# Patient Record
Sex: MALE | Race: WHITE | NOT HISPANIC OR LATINO | Employment: UNEMPLOYED | URBAN - METROPOLITAN AREA
[De-identification: names, ages, dates, MRNs, and addresses within clinical notes are randomized per-mention and may not be internally consistent; named-entity substitution may affect disease eponyms.]

---

## 2017-10-30 ENCOUNTER — GENERIC CONVERSION - ENCOUNTER (OUTPATIENT)
Dept: OTHER | Facility: OTHER | Age: 57
End: 2017-10-30

## 2018-01-11 ENCOUNTER — ALLSCRIPTS OFFICE VISIT (OUTPATIENT)
Dept: OTHER | Facility: OTHER | Age: 58
End: 2018-01-11

## 2018-01-11 NOTE — PSYCH
Behavioral Health Outpatient Intake    Referred By: INSURANCE  Intake Questions: there are no developmental disabilities  the patient does not have a hearing impairment  the patient has an ICM/CTT  INDEPENDENT LIVING  patient is not taking injectable psychiatric medications  Employment: The patient is not employed  at disabled  Emergency Contact Information:   Emergency Contact: Robbie Galvan   Relationship to Patient: WIFE   Phone Number: [phone]   Previous Psychiatric Treatment: He has previously been seen by a psychiatrist  Ana Lilia Franco 9/2017  He has previously been seen by a therapist  Ana Lilia Franco, 9/2017   History: no  service  He has not had combat service  He was not activated into federal active duty as a member of the [employer] or [employer]  Insurance Subscriber: [employer]   Primary Insurance: [employer]   ID number: [id number]         Presenting Problem (in patient's words): DEPRESSION AND ANXIETY  Substance Abuse: NONE     Previous Treatment: The patient has not been seen here in the past         Primary Care Physician: DR Brian Graham 1/11/18 @ 1:00 [id number]       Signatures   Electronically signed by : Bossman Mckenzie, ; Oct 30 2017  1:54PM EST                       (Author)
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
## 2018-03-06 ENCOUNTER — TELEPHONE (OUTPATIENT)
Dept: BEHAVIORAL/MENTAL HEALTH CLINIC | Facility: CLINIC | Age: 58
End: 2018-03-06

## 2018-05-02 ENCOUNTER — DOCUMENTATION (OUTPATIENT)
Dept: BEHAVIORAL/MENTAL HEALTH CLINIC | Facility: CLINIC | Age: 58
End: 2018-05-02

## 2018-05-02 ENCOUNTER — OFFICE VISIT (OUTPATIENT)
Dept: BEHAVIORAL/MENTAL HEALTH CLINIC | Facility: CLINIC | Age: 58
End: 2018-05-02
Payer: COMMERCIAL

## 2018-05-02 DIAGNOSIS — F32.1 CURRENT MODERATE EPISODE OF MAJOR DEPRESSIVE DISORDER WITHOUT PRIOR EPISODE (HCC): ICD-10-CM

## 2018-05-02 DIAGNOSIS — F41.1 GENERALIZED ANXIETY DISORDER: Primary | ICD-10-CM

## 2018-05-02 PROCEDURE — 90834 PSYTX W PT 45 MINUTES: CPT | Performed by: SOCIAL WORKER

## 2018-05-02 NOTE — PSYCH
Psychotherapy Provided: Individual Psychotherapy 45 minutes PHQ 9 = 13; JOVITA 7 =10; states his wife and his son (currently in college) are encouraging him regarding his reported medical issues and the anxiety he reports he experiences; "I get depressed about the insurance situation for my wife (alleges she was discontinued by her insurance carrier  I can barely can make ends meet " He reports his wife re-applied for the health insurance and is waiting for the decision for the past four months " states the anxiety extends 8 - 9 years ago contending at that time had experienced chest pains and was diagnosed with diabetes, HTN and elevated cholesterol; He was subsequently diagnosed with COPD  states he quit smoking 6 -7 years ago; He notes his wife is a hypochondriac which he states is stressor to him  He identified another stressor which is his report of his wife and his son argue with each other  Discussed anxiety management (handout)to include focusing on what one knows (not what one does not know) and what one can control (realistic expectations) and building one's knowledge base from that point;  A: presents as worried about his health and his finances; He identifies his additional stressors as his wife's health issues (perceived and otherwise) and the reported conflicts between his wife and their son; He expressed concern today about "my oxygen running out  (during this session)  " He was using oxygen tank when entering the counseling session  P:(G# 1) will complete his treatment plan to include strategies for effective anxiety management;      Length of time in session: 45 minutes, follow up in 1 month    Goals addressed in session: Goal 1     Pain:      none    0    Current suicide risk : [address]: Diagnosis and Treatment Plan explained to Clinton Ash relates understanding diagnosis and is agreeable to Treatment Plan   Yes

## 2018-05-16 ENCOUNTER — TELEPHONE (OUTPATIENT)
Dept: BEHAVIORAL/MENTAL HEALTH CLINIC | Facility: CLINIC | Age: 58
End: 2018-05-16

## 2018-05-16 ENCOUNTER — DOCUMENTATION (OUTPATIENT)
Dept: BEHAVIORAL/MENTAL HEALTH CLINIC | Facility: CLINIC | Age: 58
End: 2018-05-16

## 2018-05-30 ENCOUNTER — OFFICE VISIT (OUTPATIENT)
Dept: BEHAVIORAL/MENTAL HEALTH CLINIC | Facility: CLINIC | Age: 58
End: 2018-05-30
Payer: COMMERCIAL

## 2018-05-30 ENCOUNTER — DOCUMENTATION (OUTPATIENT)
Dept: BEHAVIORAL/MENTAL HEALTH CLINIC | Facility: CLINIC | Age: 58
End: 2018-05-30

## 2018-05-30 DIAGNOSIS — F41.1 GENERALIZED ANXIETY DISORDER: Primary | ICD-10-CM

## 2018-05-30 DIAGNOSIS — F32.1 CURRENT MODERATE EPISODE OF MAJOR DEPRESSIVE DISORDER WITHOUT PRIOR EPISODE (HCC): ICD-10-CM

## 2018-05-30 PROCEDURE — 90834 PSYTX W PT 45 MINUTES: CPT | Performed by: SOCIAL WORKER

## 2018-05-30 NOTE — PROGRESS NOTES
Mendez Finesserosa maria  1960       Date of Initial Treatment Plan: /30/18  Date of Current Treatment Plan: 05/30/18    Treatment Plan Number 1     Strengths/Personal Resources for Self Care: "friendly, helpful, for the most part happy   it comes and goes;"    Diagnosis:   F41 1, F32 1    Area of Needs: depression    Long Term Goal 1: I want to deal with the depression (be less depressed)  Target Date: 9/30/18  Completion Date: n/a         Short Term Objectives for Goal 1: AI will identify certain things that aggravete (triggers) the depression  (ex  When the wife and kid argue; "not having enough money to pay the bills")  B  I will be aware of when I am using depressed thinking  C  I will learn to think more positively/realistically ( "I have options ")  D  I will keep my med ck appointments  E  I will make sure I am taking care of myself      Target Date: 9/30/18  Completion Date: n/a      GOAL 1: Modality: Individual 1x per month   Completion Date n/a        Behavioral Health Treatment Plan [facility]: Diagnosis and Treatment Plan explained to Idania Putnam relates understanding diagnosis and is agreeable to Treatment Plan  yes      Client Comments : Please share your thoughts, feelings, need and/or experiences regarding your treatment plan:       __________________________________________________________________    __________________________________________________________________    __________________________________________________________________    __________________________________________________________________    _______________________________________                Patient signature, Date Time: __________________________________________             Physician cosigner signature, Date, Time: ________________________________
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
## 2018-05-30 NOTE — PROGRESS NOTES
Treatment Plan Tracking    # 1Treatment Plan not completed within required time limits due to: Client could not be scheduled before today to complete a tx plan (please refer to his most recent previous appt and reason for not completing tx plan)  Michelle Martinez

## 2018-06-25 ENCOUNTER — OFFICE VISIT (OUTPATIENT)
Dept: BEHAVIORAL/MENTAL HEALTH CLINIC | Facility: CLINIC | Age: 58
End: 2018-06-25
Payer: COMMERCIAL

## 2018-06-25 DIAGNOSIS — F41.1 GENERALIZED ANXIETY DISORDER: ICD-10-CM

## 2018-06-25 DIAGNOSIS — F32.1 MAJOR DEPRESSIVE DISORDER, SINGLE EPISODE, MODERATE (HCC): Primary | ICD-10-CM

## 2018-06-25 PROCEDURE — 90834 PSYTX W PT 45 MINUTES: CPT | Performed by: SOCIAL WORKER

## 2018-06-25 NOTE — PSYCH
Psychotherapy Provided: Individual Psychotherapy 45 minutes PHQ9 =10; "We got the ramp ("My [employer]az[employer] donated it to me") put in at the house  The [employer] guys put it up " states expects the motorized scooter will arrive within the next week;"It will give me a chance to get out more " "At least something good is coming out of it " reports the [employer] representative will readjust his waiver for him to be able to obtain a walk-in shower; states may have a three month waiting period; states his depression is lessened "a little" due to his report of his adjusted perspctive about his financial situation: "getting upset about it (his reported financial situation) isn't going to change anything;" "Things at home are the same with the wife " states spent his most recent SSD check within a few days with reported expenses has $20 00 left;  had used his parents' allegedly "small inheritance" to help with the expenses;  does sit on his patio when the weather is more pleasant; discussed Stages of Change regarding factors in his life regarding some of his reported challenges; discussed/reviewed effective communication strategies regarding his reported concerns with his family; A: presents as pleasant in his demeanor and as wanting to do for himself; P: (G#1 ) will continue to use effective communication strategies (including boundaries) regarding hsi relationships in his family; Length of time in session: 45 minutes, follow up in 2 week    Goals addressed in session: Goal 1     Pain:      none    0    Current suicide risk : Low         Behavioral Health Treatment Plan [facility]: Diagnosis and Treatment Plan explained to Idania Putnam relates understanding diagnosis and is agreeable to Treatment Plan   Yes

## 2018-07-13 ENCOUNTER — OFFICE VISIT (OUTPATIENT)
Dept: PSYCHIATRY | Facility: CLINIC | Age: 58
End: 2018-07-13
Payer: COMMERCIAL

## 2018-07-13 VITALS — SYSTOLIC BLOOD PRESSURE: 116 MMHG | DIASTOLIC BLOOD PRESSURE: 69 MMHG | HEIGHT: 68 IN | HEART RATE: 61 BPM

## 2018-07-13 DIAGNOSIS — F41.0 PANIC ATTACKS: ICD-10-CM

## 2018-07-13 DIAGNOSIS — F41.1 GENERALIZED ANXIETY DISORDER: Primary | ICD-10-CM

## 2018-07-13 DIAGNOSIS — F32.1 MAJOR DEPRESSIVE DISORDER, SINGLE EPISODE, MODERATE (HCC): ICD-10-CM

## 2018-07-13 PROBLEM — E66.01 MORBID OBESITY (HCC): Status: ACTIVE | Noted: 2018-07-13

## 2018-07-13 PROBLEM — E78.5 HYPERLIPIDEMIA: Status: ACTIVE | Noted: 2018-07-13

## 2018-07-13 PROBLEM — I10 HYPERTENSION: Status: ACTIVE | Noted: 2018-07-13

## 2018-07-13 PROBLEM — J44.9 COPD (CHRONIC OBSTRUCTIVE PULMONARY DISEASE) (HCC): Status: ACTIVE | Noted: 2018-07-13

## 2018-07-13 PROCEDURE — 99215 OFFICE O/P EST HI 40 MIN: CPT | Performed by: PHYSICIAN ASSISTANT

## 2018-07-13 RX ORDER — ZOLPIDEM TARTRATE 10 MG/1
10 TABLET ORAL
Qty: 30 TABLET | Refills: 1 | Status: SHIPPED | OUTPATIENT
Start: 2018-07-13 | End: 2018-09-07 | Stop reason: ALTCHOICE

## 2018-07-13 RX ORDER — DULOXETIN HYDROCHLORIDE 60 MG/1
60 CAPSULE, DELAYED RELEASE ORAL DAILY
Qty: 90 CAPSULE | Refills: 0 | Status: SHIPPED | OUTPATIENT
Start: 2018-07-13 | End: 2018-09-07 | Stop reason: SDUPTHER

## 2018-07-13 RX ORDER — ALPRAZOLAM 0.5 MG/1
0.5 TABLET ORAL DAILY PRN
Qty: 30 TABLET | Refills: 0 | Status: SHIPPED | OUTPATIENT
Start: 2018-07-13 | End: 2018-09-07 | Stop reason: SDUPTHER

## 2018-07-13 RX ORDER — HYDROXYZINE PAMOATE 50 MG/1
50 CAPSULE ORAL 3 TIMES DAILY
Qty: 270 CAPSULE | Refills: 0 | Status: SHIPPED | OUTPATIENT
Start: 2018-07-13 | End: 2018-09-07 | Stop reason: SDUPTHER

## 2018-07-13 NOTE — PSYCH
[address] Sid[address] Whitten [facility]    Name and Date of Birth:  Joy Helton 65 y o  1960    Date of Visit: July 13, 2018    Reason for visit: No chief complaint on file  HPI     Barbette Schlatter is a 65 y o  male with a history of depression, anxiety and panic attacks and arrives for psychiatric evaluation and medication mgt at the referral of his psychotherapist Maria A Álvarez who has been following Pt since 1/11/2018  Pt had seen a psychiatrist in the past but transitioned to Dina Alonzo by his own choice because he did not feel he was being helped  PMD was prescribing his psychiatric medications most recently (Duloxetine 60mg qd, Hydroxyzine 50mg (1) cap po tid, and Zolpidem 10mg qhs prn insomina,) and PMD added Alprazolam 0 5mg (1) tab po qd prn anxiety  NOTE: Pt states PMD first tried Alprazolam 1mg but requested a lower dose due to sedation  He states he does not always need Alprazolam and he reports compliance to his medications without current SE  He gets anxiety and panic attacks at times as described in below Hx  Last panic attack was triggered by inability to sleep last night due to the heat and inability to get comfortable  His medical issues can trigger his anxiety and panic  He presently denies depression, SI, HI, manic type Sxs (other than racy thoughts associated with anxiety), or psychotic Sxs  He feels his medications are helping adequately overall and wants to keep them the same  He missed his recent therapy appt due to the extreme heat and humidity, but plans to continue individual counseling with Mrs Guero Tellez  I reviewed her 1/11/2018 through 6/25/2018 notes  He ambulates with a walker and wears nasal canula for continuous O2 for his COPD  Pt grew up with biological mother, father and 3 brothers  Pt states he got along with parents  The siblings did not get along well in childhood, but better as adults      Pt first experienced Sxs of a psychiatric nature (depression and anxiety, at times with panic attacks) when he could no longer work due to his medical problems  Sxs were sadness, reduced appetite, self-isolating, anhedonia, feelings of helplessness, hopelessness, worthlessness, and SI without h/o attempt  He had one serious SI with a plan to drive his car into a tree but did not attempt because he thought of his son  Anxiety and panic attack Sxs as below  no symptoms of solo    Anxiety:   difficulty concentrating, fatigue, insomnia, irritability, restlessness/keyed up and muscle tension and racy thoughts  Panic attacks:  palpitations/racing heart, sweating, trembling, shortness of breath, nausea/GI distress, feeling hot, and sometimes dizziness      He reports 3 incidences of hearing a voice calling his name but has not had it since taking his psychiatric medicines  Hospitalization:  Once in 2017 at [place] [address] --for "Really bad anxiety" and panic attacks--self admit  No SI at that time  He was started Duloxetine, Zolpidem and Hydroxyzine  After discharge he was followed by a psychiatrist and psychotherapist outpatient for approx 1 year (Saw Dr Alejandro Mendez through the [facility] in [facility]  )  He left them because Pt did not feel he was being helped  He then came to [facility] for psych care and started therapy with Su Carrillo on 1/11/2018  PMD continued his psychiatric medications and added Alprazolam for anxiety  Pt was referred to psychiatric evaluation and care for medication mgt    Pt denies any h/o suicide attempts, self-harm behaviors other than putting a rope around his neck as a child because his mother told him to (firmly denies any suicide intent, was following what his mom told him to do), violent behaviors, ECT, or legal or  Hx      Prior Rx trials: Duloxetine, Zolpidem, Hydroxyzine, Alprazolam       HPI ROS Appetite Changes and Sleep: disrupted sleep, normal appetite, normal energy level      Review Of Systems:    Constitutional negative   ENT negative   Cardiovascular as per HPI during panic attacks   Respiratory shortness of breath, dyspnea on exertion, as noted in HPI and and during panic attacks   Gastrointestinal negative and as per HPI during panic attacks   Genitourinary negative   Musculoskeletal negative   Integumentary negative   Neurological negative and as per HPI during panic attacks   Endocrine negative   Other Symptoms none       Past Psychiatric History:     Pt grew up with biological mother, father and 3 brothers  Pt states he got along with parents  The siblings did not get along well in childhood, but better as adults  Pt first experienced Sxs of a psychiatric nature (depression and anxiety, at times with panic attacks) when he could no longer work due to his medical problems  Sxs were sadness, reduced appetite, self-isolating, anhedonia, feelings of helplessness, hopelessness, worthlessness, and SI without h/o attempt  He had one serious SI with a plan to drive his car into a tree but did not attempt because he thought of his son  Anxiety and panic attack Sxs as below  no symptoms of solo    Anxiety:   difficulty concentrating, fatigue, insomnia, irritability, restlessness/keyed up and muscle tension and racy thoughts  Panic attacks:  palpitations/racing heart, sweating, trembling, shortness of breath, nausea/GI distress, feeling hot, and sometimes dizziness      He reports 3 incidences of hearing a voice calling his name but has not had it since taking his psychiatric medicines  Hospitalization:  Once in 2017 at [place] [address] --for "Really bad anxiety" and panic attacks--self admit  No SI at that time  He was started Duloxetine, Zolpidem and Hydroxyzine        After discharge he was followed by a psychiatrist and psychotherapist outpatient for approx 1 year (Saw Dr Sylvie Pyle through the [address] [place] [address] in [place]  )  He left them because Pt did not feel he was being helped  He then came to [facility] for psych care and started therapy with Tricia Parkinson on 1/11/2018  PMD continued his psychiatric medications and added Alprazolam for anxiety  Pt was referred to psychiatric evaluation and care for medication mgt    Pt denies any h/o suicide attempts, self-harm behaviors other than putting a rope around his neck as a child because his mother told him to (firmly denies any suicide intent, was following what his mom told him to do), violent behaviors, ECT, or legal or  Hx  Prior Rx trials: Duloxetine, Zolpidem, Hydroxyzine, Alprazolam      Family Psychiatric History:     Family History   Problem Relation Age of Onset    Depression Paternal Grandfather     Depression Paternal Uncle     Anxiety disorder Paternal Uncle     Alcohol abuse Paternal Uncle     No Known Problems Brother     No Known Problems Brother     No Known Problems Brother        Substance Use History:    History   Drug Use    Types: Marijuana, Methamphetamines     Comment: Steadily smoked THC from approx 19y/o - 22y/o  --Smoked Meth in the same time period  He quit both drugs on his own  Never any rehab  He denies any IVDA       Social History:    Social History     Social History    Marital status: /Civil Union     Spouse name: N/A    Number of children: 1    Years of education: N/A     Occupational History    Disabled due to medical problems      Social History Main Topics    Smoking status: Former Smoker     Types: Cigarettes    Smokeless tobacco: Never Used      Comment: quit in approx 2012    Alcohol use Yes      Comment: Has a beer a few x a year    Drug use: Yes     Types: Marijuana, Methamphetamines      Comment: Steadily smoked THC from approx 19y/o - 22y/o  --Smoked Meth in the same time period  He quit both drugs on his own  Never any rehab    He denies any IVDA    Sexual activity: Not Currently     Other Topics Concern    Not on file     Social History Narrative    Has 1 child-- a son born 9/11/1998    Home: Lives with wife, son         Education:      Pt has h/o reading and comprehension disability  He was in Special Ed  Classes  He reached childhood milestones on time as far as he knows    Highest grade completed 10th grade    No college              Past Medical History:    History of Seizures: no  History of Head injury with loss of consciousness: no    Past Medical History:   Diagnosis Date    COPD (chronic obstructive pulmonary disease) ([facility] )     Diabetes type 2, controlled ([facility] )     Hyperlipidemia     Hypertension     Loss of bladder control     Lymphedema of both lower extremities     Morbid obesity ([facility] )     Sleep apnea      Past Surgical History:   Procedure Laterality Date    ROTATOR CUFF REPAIR Right     UMBILICAL HERNIA REPAIR       Allergies:    No Known Allergies  History Review: The following portions of the patient's history were reviewed and updated as appropriate: allergies, current medications, past family history, past medical history, past social history, past surgical history and problem list     OBJECTIVE:    Mental Status Evaluation:    Appearance casually dressed, good eye contact and adequate hygiene  Morbidly obese    Wearing nasal canula for O2 and appears to be breathing comfortably   Behavior pleasant, cooperative, calm   Speech normal rate and volume   Mood anxious   Affect normal range and intensity   Thought Processes organized   Associations intact associations   Thought Content No delusions   Perceptual Disturbances: no auditory hallucinations, no visual hallucinations, does not appear responding to internal stimuli   Abnormal Thoughts  Risk Potential Suicidal ideation - None  Homicidal ideation - None  Potential for aggression - No   Orientation oriented to person, place, time/date, situation, day of week, month of year and year   Memory short term memory grossly intact   Cosciousness alert and awake   Attention Span attention span and concentration are age appropriate   Intellect appears to be below average intelligence   Insight fair   Judgement fair   Muscle Strength and  Gait Slow, steady with walker   Language no difficulty naming common objects, no difficulty repeating a phrase   Fund of Knowledge adequate knowledge of current events  adequate fund of knowledge regarding past history  adequate fund of knowledge regarding vocabulary    Pain none   Pain Scale pain       Laboratory Results: I have personally reviewed all pertinent laboratory/tests results  --2018 labwork by [facility] viewed in EPIC    Assessment/Plan:     Diagnoses and all orders for this visit:    Generalized anxiety disorder  -     ALPRAZolam (XANAX) 0 5 mg tablet; Take 1 tablet (0 5 mg total) by mouth daily as needed for anxiety for up to 30 days  -     DULoxetine (CYMBALTA) 60 mg delayed release capsule; Take 1 capsule (60 mg total) by mouth daily for 90 days  -     hydrOXYzine pamoate (VISTARIL) 50 mg capsule; Take 1 capsule (50 mg total) by mouth 3 (three) times a day for 90 days    Panic attacks    Major depressive disorder, single episode, moderate (HCC)  -     DULoxetine (CYMBALTA) 60 mg delayed release capsule; Take 1 capsule (60 mg total) by mouth daily for 90 days  -     zolpidem (AMBIEN) 10 mg tablet; Take 1 tablet (10 mg total) by mouth daily at bedtime as needed for sleep for up to 30 days  -     TSH, 3rd generation; Future  -     RPR; Future  -     CBC and differential; Future          Plan:  Pt is having moderate anxiety and panic attacks with h/o depression  Working Dxs are JOVITA, Panic Attacks, and MDD  He feels Alprazolam and Hydroxyzine are adequate for anxiety and panic Sxs  Mood is stable and I will continue Duloxetine unchanged as well  (In general Pt does not want any changes to his regimen)    Sleep is impaired moreso due to his physical health issues and he should f/u with PMD and specialists for those  Sleep hygiene discussed anyway to optimize sleep and I will continue Zolpidem since he feels it is still helpful  I will take over prescribing all of his psychiatric medications at this time  Pt accepts the plan  Continue:  Duloxetine 60mg (1) cap po qd # 90  Alprazolam 0 5mg (1) tab po qd prn anxiety # 30  (Pt also has pills left from prior Rx by PMD so does not need refills on today's Rx)  Hydroxyzine 50mg (1) cap po tid # 270  Zolpidem 10mg (1) tab po qhs prn insomnia # 30 R2  Get TSH, RPR, CBC with diff  F/U PMD and specialists for medical problems  Continue psychotherapy  Return 8 weeks, call sooner prn    Risks/Benefits/Precautions:      Risks, Benefits And Possible Side Effects Of Medications:    Risks, benefits, and possible side effects of medications explained to Catalina Oconnell and he verbalizes understanding and agreement for treatment  Controlled Medication Discussion:     Catalina Oconnell has been filling controlled prescriptions on time as prescribed according to [facility]    Discussed with Catalina Oconnell the risks of sedation, respiratory depression, impairment of ability to drive and potential for abuse and addiction related to treatment with benzodiazepine medications  He understands risk of treatment with benzodiazepine medications, agrees to not drive if feels impaired and agrees to take medications as prescribed      Margarita Urbina PA-C

## 2018-07-16 ENCOUNTER — TELEPHONE (OUTPATIENT)
Dept: BEHAVIORAL/MENTAL HEALTH CLINIC | Facility: CLINIC | Age: 58
End: 2018-07-16

## 2018-08-27 ENCOUNTER — TELEPHONE (OUTPATIENT)
Dept: BEHAVIORAL/MENTAL HEALTH CLINIC | Facility: CLINIC | Age: 58
End: 2018-08-27

## 2018-09-07 ENCOUNTER — OFFICE VISIT (OUTPATIENT)
Dept: PSYCHIATRY | Facility: CLINIC | Age: 58
End: 2018-09-07
Payer: COMMERCIAL

## 2018-09-07 VITALS — HEART RATE: 73 BPM | SYSTOLIC BLOOD PRESSURE: 105 MMHG | DIASTOLIC BLOOD PRESSURE: 66 MMHG | HEIGHT: 68 IN

## 2018-09-07 DIAGNOSIS — F41.1 GENERALIZED ANXIETY DISORDER: Primary | ICD-10-CM

## 2018-09-07 DIAGNOSIS — F32.1 MAJOR DEPRESSIVE DISORDER, SINGLE EPISODE, MODERATE (HCC): ICD-10-CM

## 2018-09-07 DIAGNOSIS — G47.00 INSOMNIA, UNSPECIFIED TYPE: ICD-10-CM

## 2018-09-07 DIAGNOSIS — F41.0 PANIC ATTACKS: ICD-10-CM

## 2018-09-07 DIAGNOSIS — D50.9 IRON DEFICIENCY ANEMIA, UNSPECIFIED IRON DEFICIENCY ANEMIA TYPE: ICD-10-CM

## 2018-09-07 DIAGNOSIS — J44.9 CHRONIC OBSTRUCTIVE PULMONARY DISEASE, UNSPECIFIED COPD TYPE (HCC): ICD-10-CM

## 2018-09-07 PROBLEM — D64.9 ANEMIA: Status: ACTIVE | Noted: 2018-09-07

## 2018-09-07 PROCEDURE — 99214 OFFICE O/P EST MOD 30 MIN: CPT | Performed by: PHYSICIAN ASSISTANT

## 2018-09-07 RX ORDER — CHOLECALCIFEROL (VITAMIN D3) 125 MCG
5 CAPSULE ORAL
Qty: 90 TABLET | Refills: 0 | Status: SHIPPED | OUTPATIENT
Start: 2018-09-07 | End: 2018-12-06

## 2018-09-07 RX ORDER — ALPRAZOLAM 0.5 MG/1
0.5 TABLET ORAL DAILY PRN
Qty: 30 TABLET | Refills: 1 | Status: SHIPPED | OUTPATIENT
Start: 2018-09-07 | End: 2019-02-26 | Stop reason: SDUPTHER

## 2018-09-07 RX ORDER — HYDROXYZINE PAMOATE 50 MG/1
50 CAPSULE ORAL 3 TIMES DAILY
Qty: 270 CAPSULE | Refills: 0 | Status: SHIPPED | OUTPATIENT
Start: 2018-09-07 | End: 2019-02-26 | Stop reason: SDUPTHER

## 2018-09-07 RX ORDER — DULOXETIN HYDROCHLORIDE 60 MG/1
60 CAPSULE, DELAYED RELEASE ORAL DAILY
Qty: 90 CAPSULE | Refills: 0 | Status: SHIPPED | OUTPATIENT
Start: 2018-09-07 | End: 2019-02-26 | Stop reason: SDUPTHER

## 2018-09-07 NOTE — PSYCH
MEDICATION MANAGEMENT NOTE        [facility]      Name and Date of Birth:  Rico Regalado 65 y o  1960    Date of Visit: September 7, 2018    HPI:    Adam Key is here for medication review with primary c/o "The depression, that's a lot better than what it was  I still get depressed some time depending on the circumstances and rates it 3/10 right now  The anxiety--that happens every once in while and I take the anxiety medicine and that takes care of it "   He rates anxiety as 3-6/10 most of the time, but once it reached 9/10 triggered by an argument with family  Pt is s/p 1 admission August 16, 2018 to [facility]-- for Acute on chronic respiratory failure  While inpatient the physcian stopped the Zolpidem and replaced it with Melatonin 5mg (1) tab po qhs for sleep and Pt feels it is "Doing OK "  He was started on Iron supplementation for anemia and HCTZ for edema and HTN  He was seen in the [facility] ER on 8/31/2018 sent by PMD due to hyperkalemia  K+ was restored and normalized (labwork reviewed)  Pt presently denies SI, HI, panic attacks, manic type Sxs other than some racy thoughts associated with anxiety, or psychotic Sxs  He reports compliance to his psychiatric medications without SE  Pt was seen by [facility] pulmonology 2 days ago and his note was reviewed--pulmonologist discussed options of air pressure treatments for his MICHELINE and recommended pulmonary rehab  Pt intends to pursue these recommendations  Appetite Changes and Sleep: Sleep is improving, normal appetite, Suboptimal energy    Review Of Systems:      Constitutional feeling tired   ENT negative   Cardiovascular negative   Respiratory Chronic SOB, BRADY  Pt wearing nasal canula for continuous )2 and appears to be breathing and speaking without special effort      Gastrointestinal negative   Genitourinary negative   Musculoskeletal negative   Integumentary negative   Neurological negative   Endocrine negative   Other Symptoms none       Past Psychiatric History:   As copied from my 7/13/2018 note:  " [ Pt grew up with biological mother, father and 3 brothers  Pt states he got along with parents  The siblings did not get along well in childhood, but better as adults      Pt first experienced Sxs of a psychiatric nature (depression and anxiety, at times with panic attacks) when he could no longer work due to his medical problems  Sxs were sadness, reduced appetite, self-isolating, anhedonia, feelings of helplessness, hopelessness, worthlessness, and SI without h/o attempt  He had one serious SI with a plan to drive his car into a tree but did not attempt because he thought of his son  Anxiety and panic attack Sxs as below       no symptoms of solo     Anxiety:   difficulty concentrating, fatigue, insomnia, irritability, restlessness/keyed up and muscle tension and racy thoughts      Panic attacks:  palpitations/racing heart, sweating, trembling, shortness of breath, nausea/GI distress, feeling hot, and sometimes dizziness       He reports 3 incidences of hearing a voice calling his name but has not had it since taking his psychiatric medicines      Hospitalization:  Once in 2017 at [place] [address] --for "Really bad anxiety" and panic attacks--self admit  No SI at that time  He was started Duloxetine, Zolpidem and Hydroxyzine        After discharge he was followed by a psychiatrist and psychotherapist outpatient for approx 1 year (Saw Dr Alexandre Guzmán through the [facility] in [facility]  )  He left them because Pt did not feel he was being helped    He then came to [facility] for psych care and started therapy with Tricia Gallo on 1/11/2018        PMD continued his psychiatric medications and added Alprazolam for anxiety      Pt was referred to psychiatric evaluation and care for medication mgt     Pt denies any h/o suicide attempts, self-harm behaviors other than putting a rope around his neck as a child because his mother told him to (firmly denies any suicide intent, was following what his mom told him to do), violent behaviors, ECT, or legal or  Hx      Prior Rx trials: Duloxetine, Zolpidem, Hydroxyzine, Alprazolam                ] "       Past Medical History:    Past Medical History:   Diagnosis Date    COPD (chronic obstructive pulmonary disease) ([facility] )     Diabetes type 2, controlled ([facility] )     Hyperlipidemia     Hypertension     Loss of bladder control     Lymphedema of both lower extremities     Morbid obesity ([facility] )     Sleep apnea        Substance Abuse History:    History   Alcohol Use    Yes     Comment: Has a beer a few x a year     History   Drug Use    Types: Marijuana, Methamphetamines     Comment: Steadily smoked THC from approx 17y/o - 22y/o  --Smoked Meth in the same time period  He quit both drugs on his own  Never any rehab  He denies any IVDA       Social History:    Social History     Social History    Marital status: /Civil Union     Spouse name: N/A    Number of children: 1    Years of education: N/A     Occupational History    Disabled due to medical problems      Social History Main Topics    Smoking status: Former Smoker     Types: Cigarettes    Smokeless tobacco: Never Used      Comment: quit in approx 2012    Alcohol use Yes      Comment: Has a beer a few x a year    Drug use: Yes     Types: Marijuana, Methamphetamines      Comment: Steadily smoked THC from approx 17y/o - 22y/o  --Smoked Meth in the same time period  He quit both drugs on his own  Never any rehab  He denies any IVDA    Sexual activity: Not Currently     Other Topics Concern    Not on file     Social History Narrative    Has 1 child-- a son born 9/11/1998    Home: Lives with wife, son         Education:      Pt has h/o reading and comprehension disability  He was in Special Ed  Classes    He reached childhood milestones on time as far as he knows    Highest grade completed 10th grade    No college                Family Psychiatric History:     Family History   Problem Relation Age of Onset    Depression Paternal Grandfather     Depression Paternal Uncle     Anxiety disorder Paternal Uncle     Alcohol abuse Paternal Uncle     No Known Problems Brother     No Known Problems Brother     No Known Problems Brother        History Review: The following portions of the patient's history were reviewed and updated as appropriate: allergies, current medications, past family history, past medical history, past social history, past surgical history and problem list          OBJECTIVE:     Mental Status Evaluation:    Appearance Casually dressed, good eye contact and hygiene, morbid obesity, nasal canula for O2 in place     Behavior Calm, cooperative, pleasant   Speech Clear, normal rate and volume   Mood Depressed, anxious   Affect Constricted   Thought Processes Organized, goal directed   Associations intact associations   Thought Content No delusions   Perceptual Disturbances: Pt denies any form of hallucinations and does not appear to be responding to internal stimuli   Abnormal Thoughts  Risk Potential Suicidal ideation - None  Homicidal ideation - None  Potential for aggression - No   Orientation oriented to person, place, time/date, situation, day of week, month of year and year   Memory short term memory grossly intact   Cosciousness alert and awake   Attention Span attention span and concentration are age appropriate   Intellect not formally assessed   Insight fair   Judgement fair   Muscle Strength and  Gait Slow and steady with walker   Language no difficulty naming common objects, no difficulty repeating a phrase   Fund of Knowledge adequate knowledge of current events  adequate fund of knowledge regarding past history  adequate fund of knowledge regarding vocabulary    Pain none   Pain Scale 0       Laboratory Results: I have personally reviewed all pertinent laboratory/tests results  Assessment/plan:       Diagnoses and all orders for this visit:    Generalized anxiety disorder  -     hydrOXYzine pamoate (VISTARIL) 50 mg capsule; Take 1 capsule (50 mg total) by mouth 3 (three) times a day for 90 days  -     ALPRAZolam (XANAX) 0 5 mg tablet; Take 1 tablet (0 5 mg total) by mouth daily as needed for anxiety for up to 30 days  -     DULoxetine (CYMBALTA) 60 mg delayed release capsule; Take 1 capsule (60 mg total) by mouth daily for 90 days    Major depressive disorder, single episode, moderate (HCC)  -     DULoxetine (CYMBALTA) 60 mg delayed release capsule; Take 1 capsule (60 mg total) by mouth daily for 90 days    Panic attacks    Insomnia, unspecified type  -     Melatonin 5 MG TABS; Take 1 tablet (5 mg total) by mouth daily at bedtime for 90 days    Chronic obstructive pulmonary disease, unspecified COPD type (HCC)    Iron deficiency anemia, unspecified iron deficiency anemia type        PLAN:  Pt is having mild to moderate anxiety and mild depressive episodes at times, what he considers at a level with which he can cope in light of his circumstances  He feels his medications are helping and refuses any changes at this time  No further Zolpidem due to COPD exacerbation (already stopped by [facility])  Pt intends to continue psychotherapy with Mrs Herrera  Pt accepts the plan  Continue:  Duloxetine 60mg (1) cap po qd # 90  Alprazolam 0 5mg (1) tab po qd prn anxiety # 30 R1  Hydroxyzine 50mg (1) cap po tid # 270  Melatonin 5mg (1) tab po qhs # 90  Continue psychotherapy  F/U Pulmonology and PMD for medical issues  Return 8 weeks, call sooner prn     Risks/Benefits      Risks, Benefits And Possible Side Effects Of Medications:    Risks, benefits, and possible side effects of medications explained to [facility] and he verbalizes understanding and agreement for treatment

## 2018-09-13 ENCOUNTER — DOCUMENTATION (OUTPATIENT)
Dept: BEHAVIORAL/MENTAL HEALTH CLINIC | Facility: CLINIC | Age: 58
End: 2018-09-13

## 2018-09-13 ENCOUNTER — OFFICE VISIT (OUTPATIENT)
Dept: BEHAVIORAL/MENTAL HEALTH CLINIC | Facility: CLINIC | Age: 58
End: 2018-09-13
Payer: COMMERCIAL

## 2018-09-13 DIAGNOSIS — F41.0 PANIC ATTACK: ICD-10-CM

## 2018-09-13 DIAGNOSIS — F41.1 GENERALIZED ANXIETY DISORDER: Primary | ICD-10-CM

## 2018-09-13 DIAGNOSIS — F32.1 MAJOR DEPRESSIVE DISORDER, SINGLE EPISODE, MODERATE (HCC): ICD-10-CM

## 2018-09-13 PROCEDURE — 90834 PSYTX W PT 45 MINUTES: CPT | Performed by: SOCIAL WORKER

## 2018-09-13 NOTE — PROGRESS NOTES
Mendez Fabry  1960       Date of Initial Treatment Plan: 5/30/18  Date of Current Treatment Plan: 09/13/18    Treatment Plan Number 2    Strengths/Personal Resources for Self Care: "Friendly, helpful, for th emostpart happy   it comes and goes;    Diagnosis:     F41 1, F32 1    Area of Needs: depression    Long Term Goal 1: AI want to deal with the depression and be less depressed  Target Date: 1/13/19  Completion Date: n/a         Short Term Objectives for Goal 1: AIwill continue to be aware of what (triggers; ex, not enough money) aggravate the depression   B  I want be aware ofwhen I amusing depressed thinking  C  I will learn to think more positively/realistically ("I have options ")  D  I will keep my med  ck  appointments  E  I will make sure I am taking care of myself (ex , reducing amt of time spent with computer games; keeping low salt intake);      Target Date: 1/13/19  Completion Date: n/a      GOAL 1: Modality: Individual 2x per month   Completion Date n/a and The person(s) responsible for carrying out the plan is  [employer]: Diagnosis and Treatment Plan explained to Idania Putnam relates understanding diagnosis and is agreeable to Treatment Plan  yes      Client Comments : Please share your thoughts, feelings, need and/or experiences regarding your treatment plan:       __________________________________________________________________    __________________________________________________________________    __________________________________________________________________    __________________________________________________________________    _______________________________________                Patient signature, Date Time: __________________________________________             Physician cosigner signature, Date, Time: ________________________________

## 2018-09-13 NOTE — PSYCH
Psychotherapy Provided: Individual Psychotherapy 45 minutes hospitalized in August, 2018 (COPD and "my kidneys are giving out on me",), for approximately 6 -7 days (3 of the days were in ICU) followed by another hospitalization (potassium level was greater than level 5 9 ) with ICU for one day and transferred to two additional levels of care; "I gotta cut back on my salt  Portion (smaller)  my meals better;  is reducing fried food intake and eating more salads (from 2 to 3 ,4 or 5 /week); [facility] has an 10/1/18 Sleep Study; "I am feeling a little better  I am getting around a little better " [facility] continues to work with the [facility] (has a substitute who is temporary until the replacement is identified); [facility] is looking for another organization to provide aide services (already has an aide noting would like to explore another organization to provide additional hours;  is trying to better communicate in his family adding that he and his wife are getting along better; discussed/reviewed the importance of effective communication, particularly with his family;  A: presents as feeling "a little better" with the progress he is making in his communication (ex, with his wife) and with his nutrition; P: (G# ) will continue to effectvely communicate with his family and continue to make efforts to eat more healthfully; Length of time in session: 45 minutes, follow up in 45 month    Goals addressed in session: Goal 1     Pain:      none    0    Current suicide risk : Low         Behavioral Health Treatment Plan [facility]: Diagnosis and Treatment Plan explained to Margo Harms relates understanding diagnosis and is agreeable to Treatment Plan   Yes

## 2018-10-18 ENCOUNTER — TELEPHONE (OUTPATIENT)
Dept: BEHAVIORAL/MENTAL HEALTH CLINIC | Facility: CLINIC | Age: 58
End: 2018-10-18

## 2018-10-24 ENCOUNTER — OFFICE VISIT (OUTPATIENT)
Dept: BEHAVIORAL/MENTAL HEALTH CLINIC | Facility: CLINIC | Age: 58
End: 2018-10-24
Payer: COMMERCIAL

## 2018-10-24 DIAGNOSIS — F41.0 PANIC DISORDER WITHOUT AGORAPHOBIA: Primary | ICD-10-CM

## 2018-10-24 DIAGNOSIS — F32.1 MAJOR DEPRESSIVE DISORDER, SINGLE EPISODE, MODERATE (HCC): ICD-10-CM

## 2018-10-24 DIAGNOSIS — F41.0 PANIC ATTACK: ICD-10-CM

## 2018-10-24 DIAGNOSIS — F41.1 GENERALIZED ANXIETY DISORDER: ICD-10-CM

## 2018-10-24 PROCEDURE — 90834 PSYTX W PT 45 MINUTES: CPT | Performed by: SOCIAL WORKER

## 2018-10-24 NOTE — PSYCH
Psychotherapy Provided: Individual Psychotherapy 45 minutes PHq 9 = 9; JOVITA 7 = 5; attributes the report of his not feeling well to the current seasonal change; states has been hospitalized twice since the most recent previous ind psych; "I was slipping in and out of consciousness, the kidneys are giving out, Congestive Heart Failure; reports the second admission was reportedly attributed to Congestive Heart Failure and elevated potassium levels ("7 1; They said it is supposed to be below 6 0 ")  "I have gas in my blood that won't escape  That is what causes congestive heart failure " "They have me on a strict diet in the hospital  At home I can't afford that stuff  It is the chicken and the fish  Deepthi Seo Deepthi Seo I can't afford that " states a deacon from his [employer] made a recent home visit ("I needed somebody to talk to ); "It helped " "I am having problems having people to staff me (4 days @ 3 hrs/day to equal but not exceed 35 hrs /week)  " laundry, light housekeeping, running errands, companionship, meal preparation; states the communication between him and his wife, Caitie Rey is improving  We are not yelling at each other as much anymore " contends he may, at times, be asked for help by his wife and states most of the time he is unable to physically do what his wife is asking him to do; "She says I don't even try  But I know what will happen, even if I just get up and walk  I get out of breath " He states it is "a little scary" to him when he experiences difficulty breathing  discussed options of things he could do: help with dishes ("a few at a time") , cooking; It is the little things   I don't feel up to it (that is, "getting up and moving around") " When asked what motivates him (based on action drives motivation), he responded with getting up and moving around "without back pain and huffing and puffing;" He spoke of it being helpful to daydream "about my mom and my dad " discussed/reviewed the importance for him to have realistic expectations (getting up and moving around at his own pace) for moativation; Discussed/reveiewed effective communication skills including his report of some reduction in frequency in the yelling between him and his wife; His eye contact was somewhat limited today with his closing his eyes periodically during the session; P: (G#1) will continue to try to "get up and move around" to create/sustain movitavation;    Length of time in session: 45 minutes, follow up in 1 month    Goals addressed in session: Goal 1     Pain:      moderate to severe    6    Current suicide risk : [address]: Diagnosis and Treatment Plan explained to Ilene Garnett relates understanding diagnosis and is agreeable to Treatment Plan   Yes

## 2018-10-31 ENCOUNTER — TELEPHONE (OUTPATIENT)
Dept: BEHAVIORAL/MENTAL HEALTH CLINIC | Facility: CLINIC | Age: 58
End: 2018-10-31

## 2018-11-07 ENCOUNTER — OFFICE VISIT (OUTPATIENT)
Dept: BEHAVIORAL/MENTAL HEALTH CLINIC | Facility: CLINIC | Age: 58
End: 2018-11-07
Payer: COMMERCIAL

## 2018-11-07 DIAGNOSIS — F32.1 MAJOR DEPRESSIVE DISORDER, SINGLE EPISODE, MODERATE (HCC): ICD-10-CM

## 2018-11-07 DIAGNOSIS — F41.1 GENERALIZED ANXIETY DISORDER: ICD-10-CM

## 2018-11-07 PROCEDURE — 3725F SCREEN DEPRESSION PERFORMED: CPT | Performed by: SOCIAL WORKER

## 2018-11-07 PROCEDURE — 90834 PSYTX W PT 45 MINUTES: CPT | Performed by: SOCIAL WORKER

## 2018-11-07 NOTE — PSYCH
Psychotherapy Provided: Individual Psychotherapy 45 minutes PHQ 9 =7; JOVITA 7 = 8; "Me and the wife are arguing " He reports his wife "keeps bringing up the past (re their relationship)  She feels I don't care for her " "Maybe it is true  My way of caring for her and her feeling cared for are two different things  " states his belief of caring is paying the bills; He described her family as "huggy, kissy " He notes he is "worn out " He described symptoms when walking too much; ex , lightheadedness  states will be having in-home services tomorrow with having blood drawn; states "My days with Ros, his , are ending (this week-end)  " states the company by whom Ros is employed "cannot cover the hours (I need);" states had worked with Kathleen Sung for three years; states he needs 35 hours/week of services; He reported [place] (?) will begin to provide the 35 hours he reports he needs  reports pain in his "butt" due a open "blood blister" which he states is being treated with a prescribed cream; states his most recent experience with panic was the thought of going to a lab to have blood drawn; "I paniced  I did not go " states has recently had a change in his sleep aide machine from the C Pap machine to the "Bi Pap" machine  "The C Pap (machine) was not doing the job " states his oxygen level was increased "from 4 to 6;" discussed/reviewed his considering options to create positive experiences at home (ex , he and his wife watching favorite shows  together); A: presents as discouraged yet conversive about his family (including his parents/grandparents, etc) and his health; He is willing to explore some options to create positives in his life despite his feelings of discouragement  HE DECLINED TO PARTICIPATE IN THE MEN'S GROUP  P: (G# 1)  will consider simple (no cost) yet helpful ways to curtail the depression (ex , initiate positive conversation with his wife to include their watching TV shows together that they like;      Length of time in session: 45 minutes, follow up in 2 week    Goals addressed in session: Goal 1     Pain:      moderate to severe    7    Current suicide risk : [address]: Diagnosis and Treatment Plan explained to Ayesha Rojas relates understanding diagnosis and is agreeable to Treatment Plan   Yes

## 2018-12-13 ENCOUNTER — TELEPHONE (OUTPATIENT)
Dept: BEHAVIORAL/MENTAL HEALTH CLINIC | Facility: CLINIC | Age: 58
End: 2018-12-13

## 2019-01-07 ENCOUNTER — TELEPHONE (OUTPATIENT)
Dept: BEHAVIORAL/MENTAL HEALTH CLINIC | Facility: CLINIC | Age: 59
End: 2019-01-07

## 2019-02-13 ENCOUNTER — TELEPHONE (OUTPATIENT)
Dept: BEHAVIORAL/MENTAL HEALTH CLINIC | Facility: CLINIC | Age: 59
End: 2019-02-13

## 2019-02-13 ENCOUNTER — DOCUMENTATION (OUTPATIENT)
Dept: BEHAVIORAL/MENTAL HEALTH CLINIC | Facility: CLINIC | Age: 59
End: 2019-02-13

## 2019-02-13 NOTE — PROGRESS NOTES
Treatment Plan Tracking    # 1Treatment Plan not completed within required time limits due to: client cancelled his ind psych appt for today at 1 PM due to weather conditions and his fear of falling;

## 2019-02-18 ENCOUNTER — TELEPHONE (OUTPATIENT)
Dept: BEHAVIORAL/MENTAL HEALTH CLINIC | Facility: CLINIC | Age: 59
End: 2019-02-18

## 2019-02-25 NOTE — PSYCH
MEDICATION MANAGEMENT NOTE        [address] [facility]      Name and Date of Birth:  Dwain Obregon 63 y o  1960    Date of Visit: February 26, 2019    HPI:    Shyam De La Cruz is here for medication review with primary c/o "Since I got home from the hospital, everything's been going pretty descent "  Pt is s/p admission to [facility] for CHF  He is up to 6mL of O2 Bipap and Lasix were started  Pt was anxious and he was started on Buspar 5mg bid while inpatient and he feels the medicine helps his anxiety --"So far I've been able to manage it "   He obtained a Home Health Aid 6 days per week, 5 hours per day through [employer] [facility] (which replaced [address]) and is very happy with the service  He still remains connected to [facility] who helped him find [employer] services  He states "There are still some days where I get a little down or depressed" but it is much better and he attributes this to the better home service  He presently rates his anxiety 3-4/10 and depression at 3/10  Depressive Sxs are sadness/down mood, guilt over what he cannot physically do in the household, "A little bit of helplessness," and sometimes self-isolating  He presently denies SI, HI, or manic or psychotic Sxs  He continues psychotherapy with Nathalia Mckenna whose 9/13/2018 - 11/07/2018 notes I reviewed  Appetite Changes and Sleep: improved sleep, normal appetite, suboptimal but better than at last visit 9/2018    Review Of Systems:      Constitutional tired at times   ENT negative   Cardiovascular negative   Respiratory Chronic SOB, BRADY  Pt wearing nasal canula for continuous O2 and appears to be breathing and speaking without special effort      Gastrointestinal negative   Genitourinary negative   Musculoskeletal as noted in HPI   Integumentary negative   Neurological as noted in HPI   Endocrine negative   Other Symptoms none       Past Psychiatric History:   As copied from my 9/7/2018 note with updates as needed:  " [ Pt grew up with biological mother, father and 3 brothers   Pt states he got along with parents  Coby Terry siblings did not get along well in childhood, but better as adults      Pt first experienced Sxs of a psychiatric nature (depression and anxiety, at times with panic attacks) when he could no longer work due to his medical problems   Sxs were sadness, reduced appetite, self-isolating, anhedonia, feelings of helplessness, hopelessness, worthlessness, and SI without h/o attempt  Sergey Frey had one serious SI with a plan to drive his car into a tree but did not attempt because he thought of his son     Anxiety and panic attack Sxs as below       no symptoms of solo     Anxiety:   difficulty concentrating, fatigue, insomnia, irritability, restlessness/keyed up and muscle tension and racy thoughts      Panic attacks:  palpitations/racing heart, sweating, trembling, shortness of breath, nausea/GI distress, feeling hot, and sometimes dizziness       He reports 3 incidences of hearing a voice calling his name but has not had it since taking his psychiatric medicines      Hospitalization:  Once in 2017 at [place] [address] --for "Really bad anxiety" and panic attacks--self admit   No SI at that time  Sergey Frey was started Duloxetine, Zolpidem and Hydroxyzine        After discharge he was followed by a psychiatrist and psychotherapist outpatient for approx 1 year (Saw Dr Colin Thomson through the [address] [place] [address] in [facility] )  Sergey Frey left them because Pt did not feel he was being helped  Sergey Frey then came to [facility] for psych care and started therapy with Marii Mcclelland on 1/11/2018        PMD continued his psychiatric medications and added Alprazolam for anxiety      Pt was referred to psychiatric evaluation and care for medication mgt     Pt denies any h/o suicide attempts, self-harm behaviors other than putting a rope around his neck as a child because his mother told him to (firmly denies any suicide intent, was following what his mom told him to do), violent behaviors, ECT, or legal or  Hx      Prior Rx trials: Duloxetine, Zolpidem, Hydroxyzine, Alprazolam                ] "      Past Medical History:    Past Medical History:   Diagnosis Date    COPD (chronic obstructive pulmonary disease) ([facility] )     Diabetes type 2, controlled ([place] [address] )     Hyperlipidemia     Hypertension     Loss of bladder control     Lymphedema of both lower extremities     Morbid obesity ([place] [address] )     Sleep apnea        Substance Abuse History:    Social History     Substance and Sexual Activity   Alcohol Use Yes    Comment: Has a beer a few x a year     Social History     Substance and Sexual Activity   Drug Use Yes    Types: Marijuana, Methamphetamines    Comment: Steadily smoked THC from approx 17y/o - 22y/o  --Smoked Meth in the same time period  He quit both drugs on his own  Never any rehab  He denies any IVDA       Social History:    Social History     Socioeconomic History    Marital status: /Civil Union     Spouse name: Not on file    Number of children: 1    Years of education: Not on file    Highest education level: Not on file   Occupational History    Occupation: Disabled due to medical problems   Social Needs    Financial resource strain: Not on file    Food insecurity:     Worry: Sometimes true     Inability: Sometimes true   Nando Booker Transportation needs:     Medical: No     Non-medical: No   Tobacco Use    Smoking status: Former Smoker     Types: Cigarettes    Smokeless tobacco: Never Used    Tobacco comment: quit in approx 2012   Substance and Sexual Activity    Alcohol use: Yes     Comment: Has a beer a few x a year    Drug use: Yes     Types: Marijuana, Methamphetamines     Comment: Steadily smoked THC from approx 17y/o - 22y/o  --Smoked Meth in the same time period  He quit both drugs on his own  Never any rehab    He denies any IVDA    Sexual activity: Not Currently   Lifestyle    Physical activity:     Days per week: Not on file     Minutes per session: Not on file    Stress: Not on file   Relationships    Social connections:     Talks on phone: Not on file     Gets together: Not on file     Attends Oriental orthodox service: Not on file     Active member of club or organization: Not on file     Attends meetings of clubs or organizations: Not on file     Relationship status: Not on file    Intimate partner violence:     Fear of current or ex partner: Not on file     Emotionally abused: Not on file     Physically abused: Not on file     Forced sexual activity: Not on file   Other Topics Concern    Not on file   Social History Narrative    Has 1 child-- a son born 9/11/1998    Home: Lives with wife, son         Education:      Pt has h/o reading and comprehension disability  He was in Special Ed  Classes  He reached childhood milestones on time as far as he knows    Highest grade completed 10th grade    No college            Family Psychiatric History:     Family History   Problem Relation Age of Onset    Depression Paternal Grandfather     Depression Paternal Uncle     Anxiety disorder Paternal Uncle     Alcohol abuse Paternal Uncle     No Known Problems Brother     No Known Problems Brother     No Known Problems Brother        History Review: The following portions of the patient's history were reviewed and updated as appropriate: allergies, current medications, past family history, past medical history, past social history, past surgical history and problem list          OBJECTIVE:     Mental Status Evaluation:    Appearance Casually dressed, good eye contact and hygiene, morbidly obese, but appears thinner than last visit    Nasal canula with free flowing O2 in place   Behavior Calm, cooperative, pleasant   Speech Clear, normal rate and volume   Mood Less depressed, less anxious   Affect Mildly constricted   Thought Processes Organized, goal directed, some self deprecation Associations intact associations   Thought Content No delusions   Perceptual Disturbances: Pt denies any form of hallucinations and does not appear to be responding to internal stimuli   Abnormal Thoughts  Risk Potential Suicidal ideation - None  Homicidal ideation - None  Potential for aggression - No   Orientation oriented to person, place, situation, day of week, month of year and year   Memory short term memory grossly intact   Cosciousness alert and awake   Attention Span attention span and concentration are age appropriate   Intellect appears to be of average intelligence but not formally assessed   Insight good   Judgement good   Muscle Strength and  Gait Steady and stable with walker   Language no difficulty naming common objects, no difficulty repeating a phrase, no difficulty writing a sentence   Fund of Knowledge adequate knowledge of current events  adequate fund of knowledge regarding past history  adequate fund of knowledge regarding vocabulary    Pain none   Pain Scale 0       Laboratory Results: I have personally reviewed all pertinent laboratory/tests results  Assessment/plan:       Diagnoses and all orders for this visit:    Major depressive disorder, single episode, moderate (HCC)  -     DULoxetine (CYMBALTA) 60 mg delayed release capsule; Take 1 capsule (60 mg total) by mouth daily for 90 days  -     CBC and differential; Future  -     Basic metabolic panel; Future    Generalized anxiety disorder  -     hydrOXYzine pamoate (VISTARIL) 50 mg capsule; Take 1 capsule (50 mg total) by mouth 3 (three) times a day for 90 days  -     ALPRAZolam (XANAX) 0 5 mg tablet; Take 1 tablet (0 5 mg total) by mouth daily as needed for anxiety for up to 30 days  -     DULoxetine (CYMBALTA) 60 mg delayed release capsule; Take 1 capsule (60 mg total) by mouth daily for 90 days  -     busPIRone (BUSPAR) 5 mg tablet;  Take 1 tablet (5 mg total) by mouth 2 (two) times a day for 90 days    Panic attack    Insomnia, unspecified type  -     Melatonin 5 MG TABS; Take 1 tablet (5 mg total) by mouth daily at bedtime for 90 days    Chronic bronchitis, unspecified chronic bronchitis type ([address] )        PLAN:  Pt is having mild anxiety and depression, but improved and he can cope on the present regimen which I will continue, and which now includes Buspar from [facility]  Treatment plan done and Pt accepts the plan  Continue:  Buspar 5mg (1) tab po bid # 180  Duloxetine 60mg (1) cap po qd # 90  Alprazolam 0 5mg (1) tab po qd prn anxiety # 30 R1  Hydroxyzine 50mg (1) cap po tid # 270  Melatonin 5mg (1) tab po qhs # 90  Continue psychotherapy  Continue f/u with pulmonology as sched  CBC, BMP   Return 8 weeks, call sooner prn    Risks/Benefits      Risks, Benefits And Possible Side Effects Of Medications:    Risks, benefits, and possible side effects of medications explained to [facility] and he verbalizes understanding and agreement for treatment  Controlled Medication Discussion:     [facility] has been filling controlled prescriptions on time as prescribed according to [address]    Discussed with [facility] the risks of sedation, respiratory depression, impairment of ability to drive and potential for abuse and addiction related to treatment with benzodiazepine medications  He understands risk of treatment with benzodiazepine medications, agrees to not drive if feels impaired and agrees to take medications as prescribed

## 2019-02-26 ENCOUNTER — OFFICE VISIT (OUTPATIENT)
Dept: PSYCHIATRY | Facility: CLINIC | Age: 59
End: 2019-02-26
Payer: COMMERCIAL

## 2019-02-26 VITALS — SYSTOLIC BLOOD PRESSURE: 113 MMHG | DIASTOLIC BLOOD PRESSURE: 48 MMHG | HEART RATE: 82 BPM

## 2019-02-26 DIAGNOSIS — F32.1 MAJOR DEPRESSIVE DISORDER, SINGLE EPISODE, MODERATE (HCC): Primary | ICD-10-CM

## 2019-02-26 DIAGNOSIS — F41.1 GENERALIZED ANXIETY DISORDER: ICD-10-CM

## 2019-02-26 DIAGNOSIS — G47.00 INSOMNIA, UNSPECIFIED TYPE: ICD-10-CM

## 2019-02-26 DIAGNOSIS — J42 CHRONIC BRONCHITIS, UNSPECIFIED CHRONIC BRONCHITIS TYPE (HCC): ICD-10-CM

## 2019-02-26 DIAGNOSIS — F41.0 PANIC ATTACK: ICD-10-CM

## 2019-02-26 PROCEDURE — 99214 OFFICE O/P EST MOD 30 MIN: CPT | Performed by: PHYSICIAN ASSISTANT

## 2019-02-26 RX ORDER — CHOLECALCIFEROL (VITAMIN D3) 125 MCG
5 CAPSULE ORAL
Qty: 90 TABLET | Refills: 0 | Status: SHIPPED | OUTPATIENT
Start: 2019-02-26 | End: 2019-04-30 | Stop reason: SDUPTHER

## 2019-02-26 RX ORDER — ALPRAZOLAM 0.5 MG/1
0.5 TABLET ORAL DAILY PRN
Qty: 30 TABLET | Refills: 1 | Status: SHIPPED | OUTPATIENT
Start: 2019-02-26 | End: 2019-04-30 | Stop reason: SDUPTHER

## 2019-02-26 RX ORDER — HYDROXYZINE PAMOATE 50 MG/1
50 CAPSULE ORAL 3 TIMES DAILY
Qty: 270 CAPSULE | Refills: 0 | Status: SHIPPED | OUTPATIENT
Start: 2019-02-26 | End: 2019-04-30 | Stop reason: SDUPTHER

## 2019-02-26 RX ORDER — DULOXETIN HYDROCHLORIDE 60 MG/1
60 CAPSULE, DELAYED RELEASE ORAL DAILY
Qty: 90 CAPSULE | Refills: 0 | Status: SHIPPED | OUTPATIENT
Start: 2019-02-26 | End: 2019-04-30 | Stop reason: SDUPTHER

## 2019-02-26 RX ORDER — BUSPIRONE HYDROCHLORIDE 5 MG/1
5 TABLET ORAL 2 TIMES DAILY
Qty: 180 TABLET | Refills: 0 | Status: SHIPPED | OUTPATIENT
Start: 2019-02-26 | End: 2019-04-30 | Stop reason: SDUPTHER

## 2019-02-27 ENCOUNTER — OFFICE VISIT (OUTPATIENT)
Dept: BEHAVIORAL/MENTAL HEALTH CLINIC | Facility: CLINIC | Age: 59
End: 2019-02-27
Payer: COMMERCIAL

## 2019-02-27 ENCOUNTER — DOCUMENTATION (OUTPATIENT)
Dept: BEHAVIORAL/MENTAL HEALTH CLINIC | Facility: CLINIC | Age: 59
End: 2019-02-27

## 2019-02-27 DIAGNOSIS — F32.1 MAJOR DEPRESSIVE DISORDER, SINGLE EPISODE, MODERATE (HCC): ICD-10-CM

## 2019-02-27 DIAGNOSIS — F41.1 GENERALIZED ANXIETY DISORDER: Primary | ICD-10-CM

## 2019-02-27 PROCEDURE — 90834 PSYTX W PT 45 MINUTES: CPT | Performed by: SOCIAL WORKER

## 2019-02-27 PROCEDURE — 3725F SCREEN DEPRESSION PERFORMED: CPT | Performed by: SOCIAL WORKER

## 2019-02-27 NOTE — PSYCH
Psychotherapy Provided: Individual Psychotherapy 45 minutes PHQ 9 = 3; JOVITA 7 = 3; reports his medical situation has stabilized since his most recent hospitalization during the [place] season;  had been having difficulty expelling the CO 2 in his breathing; states changed from a C Pap to a Bi Pap machine since hospitalization which he states help improve the quality of his breathing; states his mood has improved because "at least (now) I can get up and do something;" spoke positively about his involvement with [facility] to include his having worked with a physical therapist in the home following hospital discharge; "They really do a good job " discussed/reviewed the importance of self-care; A:  is getting up to get his own drinks (his having more energy) and did make dinner for the family one night recently as his wife had not been feeling well; He noted has sifted through 7 -8 years of paperwork regarding saving, recycling and shredding  states is looking forward to "going through the living room (to sort for garbage and recycling);" P:(G# 1) will continue with self-care and maintaining motivation;     Length of time in session: 45 minutes, follow up in 1 month    Goals addressed in session: Goal 1     Pain:      none    0    Current suicide risk : [address]: Diagnosis and Treatment Plan explained to Rahul Quiroga relates understanding diagnosis and is agreeable to Treatment Plan   Yes

## 2019-02-27 NOTE — PROGRESS NOTES
Epifanio Sonia  1960       Date of Initial Treatment Plan:5 /30/18  Date of Current Treatment Plan: 02/27/19    Treatment Plan Number 3    Strengths/Personal Resources for Self Care: "Friendly, helpful for the most part happy, it comes and goes;"    Diagnosis: F41 1, F32 1    Area of Needs: depression; Long Term Goal 1: AI want to deal with the depression and be less depressed  Target Date: 6/27/19  Completion Date: n/a         Short Term Objectives for Goal 1: AI will remain aware of the depression triggers (ex , finances)  B  I will remain aware of the depression thinking (neg  thinking and, also, be more aware of when I am using positive/realistic (depression reducing thinking)  C  I will continue to keep my med ck appointments  D I will make sure I am taking care of myself (ex , continuing to use my phys ther exercises and keep my low salt intake)        Target Date: 6/27/19  Completion Date: n/a      GOAL 1: Modality: Individual 1x per month   Completion Date n/a and The person(s) responsible for carrying out the plan is  [employer]: Diagnosis and Treatment Plan explained to Kiarra Camacho relates understanding diagnosis and is agreeable to Treatment Plan  yes      Client Comments : Please share your thoughts, feelings, need and/or experiences regarding your treatment plan:       __________________________________________________________________    __________________________________________________________________    __________________________________________________________________    __________________________________________________________________    _______________________________________                Patient signature, Date Time: __________________________________________             Physician cosigner signature, Date, Time: ________________________________

## 2019-02-27 NOTE — PROGRESS NOTES
Treatment Plan Tracking    # 1Treatment Plan not completed within required time limits due to: client  cancelled two appts: inclement weather and hospitalization;

## 2019-04-15 ENCOUNTER — TELEPHONE (OUTPATIENT)
Dept: BEHAVIORAL/MENTAL HEALTH CLINIC | Facility: CLINIC | Age: 59
End: 2019-04-15

## 2019-04-29 ENCOUNTER — OFFICE VISIT (OUTPATIENT)
Dept: BEHAVIORAL/MENTAL HEALTH CLINIC | Facility: CLINIC | Age: 59
End: 2019-04-29
Payer: COMMERCIAL

## 2019-04-29 DIAGNOSIS — F41.1 GENERALIZED ANXIETY DISORDER: ICD-10-CM

## 2019-04-29 DIAGNOSIS — F41.0 PANIC ATTACK: ICD-10-CM

## 2019-04-29 DIAGNOSIS — F32.1 MAJOR DEPRESSIVE DISORDER, SINGLE EPISODE, MODERATE (HCC): Primary | ICD-10-CM

## 2019-04-29 PROCEDURE — 3725F SCREEN DEPRESSION PERFORMED: CPT | Performed by: SOCIAL WORKER

## 2019-04-29 PROCEDURE — 90834 PSYTX W PT 45 MINUTES: CPT | Performed by: SOCIAL WORKER

## 2019-04-30 ENCOUNTER — OFFICE VISIT (OUTPATIENT)
Dept: PSYCHIATRY | Facility: CLINIC | Age: 59
End: 2019-04-30
Payer: COMMERCIAL

## 2019-04-30 VITALS — DIASTOLIC BLOOD PRESSURE: 63 MMHG | HEART RATE: 74 BPM | HEIGHT: 68 IN | SYSTOLIC BLOOD PRESSURE: 101 MMHG

## 2019-04-30 DIAGNOSIS — G47.00 INSOMNIA, UNSPECIFIED TYPE: ICD-10-CM

## 2019-04-30 DIAGNOSIS — E66.01 MORBID OBESITY (HCC): ICD-10-CM

## 2019-04-30 DIAGNOSIS — D50.8 OTHER IRON DEFICIENCY ANEMIA: ICD-10-CM

## 2019-04-30 DIAGNOSIS — F32.1 MAJOR DEPRESSIVE DISORDER, SINGLE EPISODE, MODERATE (HCC): ICD-10-CM

## 2019-04-30 DIAGNOSIS — F41.1 GENERALIZED ANXIETY DISORDER: Primary | ICD-10-CM

## 2019-04-30 DIAGNOSIS — F41.0 PANIC ATTACK: ICD-10-CM

## 2019-04-30 DIAGNOSIS — J42 CHRONIC BRONCHITIS, UNSPECIFIED CHRONIC BRONCHITIS TYPE (HCC): ICD-10-CM

## 2019-04-30 PROCEDURE — 99214 OFFICE O/P EST MOD 30 MIN: CPT | Performed by: PHYSICIAN ASSISTANT

## 2019-04-30 RX ORDER — HYDROXYZINE PAMOATE 50 MG/1
50 CAPSULE ORAL 3 TIMES DAILY
Qty: 270 CAPSULE | Refills: 0 | Status: SHIPPED | OUTPATIENT
Start: 2019-04-30 | End: 2019-07-23 | Stop reason: SDUPTHER

## 2019-04-30 RX ORDER — CHOLECALCIFEROL (VITAMIN D3) 125 MCG
5 CAPSULE ORAL
Qty: 90 TABLET | Refills: 0 | Status: SHIPPED | OUTPATIENT
Start: 2019-04-30 | End: 2019-07-23 | Stop reason: SDUPTHER

## 2019-04-30 RX ORDER — ALPRAZOLAM 0.5 MG/1
0.5 TABLET ORAL DAILY PRN
Qty: 30 TABLET | Refills: 1 | Status: SHIPPED | OUTPATIENT
Start: 2019-04-30 | End: 2019-07-23 | Stop reason: SDUPTHER

## 2019-04-30 RX ORDER — BUSPIRONE HYDROCHLORIDE 5 MG/1
5 TABLET ORAL 2 TIMES DAILY
Qty: 180 TABLET | Refills: 0 | Status: SHIPPED | OUTPATIENT
Start: 2019-04-30 | End: 2019-07-23 | Stop reason: SDUPTHER

## 2019-04-30 RX ORDER — DULOXETIN HYDROCHLORIDE 60 MG/1
60 CAPSULE, DELAYED RELEASE ORAL DAILY
Qty: 90 CAPSULE | Refills: 0 | Status: SHIPPED | OUTPATIENT
Start: 2019-04-30 | End: 2019-07-23 | Stop reason: SDUPTHER

## 2019-05-13 ENCOUNTER — TELEPHONE (OUTPATIENT)
Dept: BEHAVIORAL/MENTAL HEALTH CLINIC | Facility: CLINIC | Age: 59
End: 2019-05-13

## 2019-06-26 ENCOUNTER — DOCUMENTATION (OUTPATIENT)
Dept: PSYCHIATRY | Facility: CLINIC | Age: 59
End: 2019-06-26

## 2019-06-28 DIAGNOSIS — F41.1 GENERALIZED ANXIETY DISORDER: ICD-10-CM

## 2019-06-28 RX ORDER — HYDROXYZINE PAMOATE 50 MG/1
50 CAPSULE ORAL 3 TIMES DAILY
Qty: 270 CAPSULE | Refills: 0 | OUTPATIENT
Start: 2019-06-28 | End: 2019-09-26

## 2019-07-02 ENCOUNTER — TELEPHONE (OUTPATIENT)
Dept: BEHAVIORAL/MENTAL HEALTH CLINIC | Facility: CLINIC | Age: 59
End: 2019-07-02

## 2019-07-23 ENCOUNTER — TELEPHONE (OUTPATIENT)
Dept: PSYCHIATRY | Facility: CLINIC | Age: 59
End: 2019-07-23

## 2019-07-23 DIAGNOSIS — F41.1 GENERALIZED ANXIETY DISORDER: ICD-10-CM

## 2019-07-23 DIAGNOSIS — F32.1 MAJOR DEPRESSIVE DISORDER, SINGLE EPISODE, MODERATE (HCC): ICD-10-CM

## 2019-07-23 DIAGNOSIS — G47.00 INSOMNIA, UNSPECIFIED TYPE: ICD-10-CM

## 2019-07-23 RX ORDER — HYDROXYZINE PAMOATE 50 MG/1
50 CAPSULE ORAL 3 TIMES DAILY
Qty: 270 CAPSULE | Refills: 0 | Status: SHIPPED | OUTPATIENT
Start: 2019-07-23 | End: 2019-10-21

## 2019-07-23 RX ORDER — CHOLECALCIFEROL (VITAMIN D3) 125 MCG
5 CAPSULE ORAL
Qty: 90 TABLET | Refills: 0 | Status: SHIPPED | OUTPATIENT
Start: 2019-07-23 | End: 2019-10-21

## 2019-07-23 RX ORDER — ALPRAZOLAM 0.5 MG/1
0.5 TABLET ORAL DAILY PRN
Qty: 30 TABLET | Refills: 0 | Status: SHIPPED | OUTPATIENT
Start: 2019-07-23 | End: 2019-08-16 | Stop reason: SDUPTHER

## 2019-07-23 RX ORDER — BUSPIRONE HYDROCHLORIDE 5 MG/1
5 TABLET ORAL 2 TIMES DAILY
Qty: 180 TABLET | Refills: 0 | Status: SHIPPED | OUTPATIENT
Start: 2019-07-23 | End: 2019-08-16 | Stop reason: SDUPTHER

## 2019-07-23 RX ORDER — DULOXETIN HYDROCHLORIDE 60 MG/1
60 CAPSULE, DELAYED RELEASE ORAL DAILY
Qty: 90 CAPSULE | Refills: 0 | Status: SHIPPED | OUTPATIENT
Start: 2019-07-23 | End: 2019-10-21

## 2019-07-23 NOTE — TELEPHONE ENCOUNTER
Ronaldo Chi made an appt for 8/16/2019    I renewed the following Rxs to his designated [facility] pharmacy:  Buspar 5mg (1) tab po bid # 180  Duloxetine 60mg (1) cap po qd # 90  Alprazolam 0 5mg (1) tab po qd prn anxiety # 30   Hydroxyzine 50mg (1) cap po tid # 270  Melatonin 5mg (1) tab po qhs # 90

## 2019-08-05 ENCOUNTER — DOCUMENTATION (OUTPATIENT)
Dept: BEHAVIORAL/MENTAL HEALTH CLINIC | Facility: CLINIC | Age: 59
End: 2019-08-05

## 2019-08-05 ENCOUNTER — SOCIAL WORK (OUTPATIENT)
Dept: BEHAVIORAL/MENTAL HEALTH CLINIC | Facility: CLINIC | Age: 59
End: 2019-08-05
Payer: COMMERCIAL

## 2019-08-05 DIAGNOSIS — F32.1 MAJOR DEPRESSIVE DISORDER, SINGLE EPISODE, MODERATE (HCC): ICD-10-CM

## 2019-08-05 DIAGNOSIS — F41.1 GENERALIZED ANXIETY DISORDER: ICD-10-CM

## 2019-08-05 DIAGNOSIS — F41.0 PANIC ATTACK: Primary | ICD-10-CM

## 2019-08-05 PROCEDURE — 90834 PSYTX W PT 45 MINUTES: CPT | Performed by: SOCIAL WORKER

## 2019-08-05 RX ORDER — DULOXETIN HYDROCHLORIDE 60 MG/1
60 CAPSULE, DELAYED RELEASE ORAL DAILY
Qty: 90 CAPSULE | Refills: 0 | OUTPATIENT
Start: 2019-08-05 | End: 2019-11-03

## 2019-08-05 NOTE — PSYCH
Psychotherapy Provided: Individual Psychotherapy 45 minutes PHQ 9 =17; JOVITA 7 = 8; When assessing about suicidality via the PHQ 9 Inventory, he noted, "I would not hurt myself  I would be better off if I would just die  (It would mean) less people would worry about me "  "I am slowly feeling better already (with the return of his aide whom he reports had been gone for two months)  " He had noted that he had had substitute caregivers within the past two months with whom he reports had been dissatisfied  "I fell twice (a day or two before she, his ongoing aide, had her two month leave)  "  has been hospitalized twice within the past two months regarding "elevated blood levels;"  had, then, fallen while in his bathroom; "hurt my thumb and my knee;"  has been going to sleep at 3,4 or 5 o'clock in the morning  I think it is due to working third shift for 30 years  "The only thing that is keeping me going is my depression and anxiety pills "   has resumed the HTN medication dosage (please refer to 4/29/19 progress note) which he had reported had been taking in April, 2019;  FEELS THE 5 MG OF MELATONIN  "IS NOT WORKING " He confirmed he will discuss this matter with Niall WEAVER  states he has been taking the Alprazolam "twice and sometimes three times a day;" He attributes his more recent experiences with anxiety to his having many caregivers in the absence of his main caregiver for the past two months  "I never knew who I was going to get " discussed/reviewed the importance of his focusing on what he is able to do to ensure he is communicating effectively about his needs; A: presents as somewhat frustrated with the effectiveness of the caregivers with his contention of his efforts to clarify his needs; He is trying to take into account alleged language challenges between caregivers and patients   "As long as Becky Minor is around (his current caregiver), everything goes better " P: (G#1) will continue with self-care and assertiveness;            Length of time in session: 45 minutes, follow up in 1 month    Goals addressed in session: Goal 1     Pain:      minimum to moderate    4    Current suicide risk : [address]: Diagnosis and Treatment Plan explained to Charlyne Lefort relates understanding diagnosis and is agreeable to Treatment Plan   Yes

## 2019-08-05 NOTE — PROGRESS NOTES
Treatment Plan Tracking    # 1Treatment Plan not completed within required time limits due to: client had not scheduled this appointment today within the four month time frame required to update his tst plan;

## 2019-08-05 NOTE — BH TREATMENT PLAN
Jeovany Johnson  1960       Date of Initial Treatment Plan:  5/30/18  Date of Current Treatment Plan: 08/05/19    Treatment Plan Number 4    Strengths/Personal Resources for Self Care: "friendly, helpful for th emost part, happy, it comes and goes;"  Diagnosis:   1  Panic attack     2  Generalized anxiety disorder     3  Major depressive disorder, single episode, moderate ([facility] [address] )         Area of Needs: depression and anxiety    Long Term Goal 1: AI want to continue to deal with the depression and the anxiety so I am less depressed and less anxious  Target Date: 12/5/19  Completion Date: n/a         Short Term Objectives for Goal 1: AI want to remaina aware of the depression/anxiety triggers (ex , finances)  B  I will remain aware of the depression/anxiety (neg) thinking and replace with lower depression/anxiety thinking (pos/realistic)  C  I will continue to keep my med ck appts  D  I will make sure I continue to take care of myself which includes watching salt intake and asserting myself regarding my needs       Target Date; 12/5/19  Completion Date: n/a      GOAL 1: Modality: Individual 1x per month   Completion Date n/a and The person(s) responsible for carrying out the plan is   20[address] Antonia Coppola: Diagnosis and Treatment Plan explained to Pedro Sheffield relates understanding diagnosis and is agreeable to Treatment Plan  yes      Client Comments : Please share your thoughts, feelings, need and/or experiences regarding your treatment plan:

## 2019-08-14 NOTE — PSYCH
MEDICATION MANAGEMENT NOTE        ST  [facility]      Name and Date of Birth:  Epifanio Callaway 63 y o  1960    Date of Visit: August 16, 2019    HPI:    Epifanio Callaway is here for medication review and requests his [address] to be here with him during the visit  He voices a primary c/o "The only anxiety I have is right now it's hot and humid" especially in the car  His anxiety goes up to 9/10 in these situations but Alprazolam helps  He admits to having one panic attack and used Alprazolam tid on that day-- which occurred in 6/2019 when his usual [address]  The temporary replacement did not go well  He is not sleeping well which causes "Mini panic attacks "   His panic Sxs are moderate to severe anxiety, hot flashes, sweats, heart pounding, SOB, nausea, and sometimes chest pain and dry heaves  He used to work third shift and thinks this might be affecting his sleep cycle  He presently denies depression, SI, HI, or manic or psychotic Sxs  Aside from one day of increasing his BZD, he otherwise reports compliance to his psychiatric medications without SE  Pt presently denies  Pt continues psychotherapy with Adelina Finders whose 8/5/2019 note with full Tx plan-- I reviewed  He reports diabetic neuropathy with pain and paresthesias in both feet        Appetite Changes and Sleep: decreased sleep, normal appetite, normal energy level     Review Of Systems:      Constitutional negative   ENT negative   Cardiovascular as noted in HPI   Respiratory as noted in HPI  Pt feels he breathes adequately on the continuous 02 via nasal canula   Gastrointestinal as noted in HPI   Genitourinary negative   Musculoskeletal as noted in HPI   Integumentary negative   Neurological as noted in HPI   Endocrine negative   Other Symptoms none       Past Psychiatric History:   As copied from my 4/30/2019 note with updates as needed:  " [ Pt grew up with biological mother, father and 3 brothers   Pt states he got along with parents  Savita Alexander siblings did not get along well in childhood, but better as adults      Pt first experienced Sxs of a psychiatric nature (depression and anxiety, at times with panic attacks) when he could no longer work due to his medical problems   Sxs were sadness, reduced appetite, self-isolating, anhedonia, feelings of helplessness, hopelessness, worthlessness, and SI without h/o attempt  Christos Aaron had one serious SI with a plan to drive his car into a tree but did not attempt because he thought of his son     Anxiety and panic attack Sxs as below       no symptoms of solo     Anxiety:   difficulty concentrating, fatigue, insomnia, irritability, restlessness/keyed up and muscle tension and racy thoughts      Panic attacks:  palpitations/racing heart, sweating, trembling, shortness of breath, nausea/GI distress, feeling hot, and sometimes dizziness       He reports 3 incidences of hearing a voice calling his name but has not had it since taking his psychiatric medicines      Hospitalization:  Once in 2017 at [place] [address] --for "Really bad anxiety" and panic attacks--self admit   No SI at that time  Christos Aaron was started Duloxetine, Zolpidem and Hydroxyzine        After discharge he was followed by a psychiatrist and psychotherapist outpatient for approx 1 year (Saw Dr Jerome Johnson through the [address] [place] [address] in [facility] )  Christos Aaron left them because Pt did not feel he was being helped  Christos Aaron then came to [facility] for psych care and started therapy with Ruthie Callahan on 1/11/2018        PMD continued his psychiatric medications and added Alprazolam for anxiety      Pt was referred to psychiatric evaluation and care for medication mgt     Pt denies any h/o suicide attempts, self-harm behaviors other than putting a rope around his neck as a child because his mother told him to (firmly denies any suicide intent, was following what his mom told him to do), violent behaviors, ECT, or legal or  Hx      Prior Rx trials: Duloxetine, Zolpidem, Hydroxyzine, Alprazolam                ] "      Past Medical History:    Past Medical History:   Diagnosis Date    COPD (chronic obstructive pulmonary disease) ([facility] )     Diabetes type 2, controlled ([place] [address] )     Hyperlipidemia     Hypertension     Loss of bladder control     Lymphedema of both lower extremities     Morbid obesity ([place] [address] )     Sleep apnea        Substance Abuse History:    Social History     Substance and Sexual Activity   Alcohol Use Yes    Comment: Has a beer a few x a year     Social History     Substance and Sexual Activity   Drug Use Yes    Types: Marijuana, Methamphetamines    Comment: Steadily smoked THC from approx 17y/o - 20y/o  --Smoked Meth in the same time period  He quit both drugs on his own  Never any rehab  He denies any IVDA       Social History:    Social History     Socioeconomic History    Marital status: /Civil Union     Spouse name: Not on file    Number of children: 1    Years of education: Not on file    Highest education level: Not on file   Occupational History    Occupation: Disabled due to medical problems   Social Needs    Financial resource strain: Not on file    Food insecurity:     Worry: Sometimes true     Inability: Sometimes true   Jennifer Wellsr Transportation needs:     Medical: No     Non-medical: No   Tobacco Use    Smoking status: Former Smoker     Types: Cigarettes    Smokeless tobacco: Never Used    Tobacco comment: quit in approx 2012   Substance and Sexual Activity    Alcohol use: Yes     Comment: Has a beer a few x a year    Drug use: Yes     Types: Marijuana, Methamphetamines     Comment: Steadily smoked THC from approx 17y/o - 20y/o  --Smoked Meth in the same time period  He quit both drugs on his own  Never any rehab    He denies any IVDA    Sexual activity: Not Currently   Lifestyle    Physical activity:     Days per week: Not on file Minutes per session: Not on file    Stress: Not on file   Relationships    Social connections:     Talks on phone: Not on file     Gets together: Not on file     Attends Jainism service: Not on file     Active member of club or organization: Not on file     Attends meetings of clubs or organizations: Not on file     Relationship status: Not on file    Intimate partner violence:     Fear of current or ex partner: Not on file     Emotionally abused: Not on file     Physically abused: Not on file     Forced sexual activity: Not on file   Other Topics Concern    Not on file   Social History Narrative    Has 1 child-- a son born 9/11/1998    Home: Lives with wife, son         Education:      Pt has h/o reading and comprehension disability  He was in Special Ed  Classes  He reached childhood milestones on time as far as he knows    Highest grade completed 10th grade    No college            Family Psychiatric History:     Family History   Problem Relation Age of Onset    Depression Paternal Grandfather     Depression Paternal Uncle     Anxiety disorder Paternal Uncle     Alcohol abuse Paternal Uncle     No Known Problems Brother     No Known Problems Brother     No Known Problems Brother        History Review:  The following portions of the patient's history were reviewed and updated as appropriate: allergies, current medications, past family history, past medical history, past social history, past surgical history and problem list          OBJECTIVE:     Mental Status Evaluation:    Appearance Casually dressed, good eye contact and hygiene   Behavior Calm, cooperative, pleasant   Speech Clear, normal rate and volume   Mood Anxious   Affect Mildly constricted   Thought Processes Organized, goal directed   Associations intact associations   Thought Content No delusions   Perceptual Disturbances: Pt denies any form of hallucinations and does not appear to be responding to internal stimuli   Abnormal Thoughts  Risk Potential Suicidal ideation - None  Homicidal ideation - None  Potential for aggression - No   Orientation oriented to person, place, situation, day of week, date, month of year and year   Memory short term memory grossly intact   Cosciousness alert and awake   Attention Span attention span and concentration are age appropriate   Intellect appears to be of average intelligence   Insight good   Judgement good   Muscle Strength and  Gait uses wheelchair   Language no difficulty naming common objects, no difficulty repeating a phrase   Fund of Knowledge adequate knowledge of current events  adequate fund of knowledge regarding past history  adequate fund of knowledge regarding vocabulary    Pain mild   Pain Scale 4       Laboratory Results: I have personally reviewed all pertinent laboratory/tests results  Care everywhere labwork/imaging reviewed  EKG done and was abnormal with a lengthened Qt, but no specifics  Assessment/plan:       Diagnoses and all orders for this visit:    Generalized anxiety disorder  -     busPIRone (BUSPAR) 5 mg tablet; 1-2 tabs by mouth twice daily  -     ALPRAZolam (XANAX) 0 5 mg tablet; Take 1 tablet (0 5 mg total) by mouth daily as needed for anxiety for up to 30 days    Panic attack    Major depressive disorder, single episode, moderate (H[facility])    Insomnia, unspecified type          PLAN:  Pt is having mild to severe ranging anxiety depending on certain situational triggers  He appears stable on his present medications with depression and anxiety controlled overall  I will increase Buspar to allow for higher dosing when anxiety intensifies very much, so as to avoid the need for increase in the BZD  Continue all other present medicines unchanged  I strongly advised against making any changes to any of his dosing regimens and reminded him of the addictive potential of Alprazolam   Pt accepted the plan     Increase Buspar 5mg (1-2) tabs po bid --Pt just had a Rx renewed 7/23/2019 for a 90 day supply and I will give him an instructional Rx today  Continue: Alprazolam 0 5mg (1) tab po qd # 30 R2  Continue the following which were renewed for a 90 day supply on 7/23/2019:  Duloxetine 60mg (1) cap po qd   Hydroxyzine 50mg (1) cap po tid   Melatonin 5mg (1) tab po qhs   Continue psychotherapy   I advised Pt to gave a copy of his EKG with full report/details sent to this office  Return 12 weeks, call sooner prn,   Pt to call in the future--when he needs Rx renewals (before next visit)    Risks/Benefits      Risks, Benefits And Possible Side Effects Of Medications:    Risks, benefits, and possible side effects of medications explained to [facility] and he verbalizes understanding and agreement for treatment  Controlled Medication Discussion:     [facility] has been filling controlled prescriptions on time as prescribed according to [address]    Discussed with [facility] the risks of sedation, respiratory depression, impairment of ability to drive and potential for abuse and addiction related to treatment with benzodiazepine medications  He understands risk of treatment with benzodiazepine medications, agrees to not drive if feels impaired and agrees to take medications as prescribed

## 2019-08-16 ENCOUNTER — OFFICE VISIT (OUTPATIENT)
Dept: PSYCHIATRY | Facility: CLINIC | Age: 59
End: 2019-08-16
Payer: COMMERCIAL

## 2019-08-16 VITALS — SYSTOLIC BLOOD PRESSURE: 146 MMHG | HEIGHT: 68 IN | DIASTOLIC BLOOD PRESSURE: 75 MMHG | HEART RATE: 70 BPM

## 2019-08-16 DIAGNOSIS — F41.0 PANIC ATTACK: ICD-10-CM

## 2019-08-16 DIAGNOSIS — F32.1 MAJOR DEPRESSIVE DISORDER, SINGLE EPISODE, MODERATE (HCC): ICD-10-CM

## 2019-08-16 DIAGNOSIS — G47.00 INSOMNIA, UNSPECIFIED TYPE: ICD-10-CM

## 2019-08-16 DIAGNOSIS — F41.1 GENERALIZED ANXIETY DISORDER: Primary | ICD-10-CM

## 2019-08-16 PROCEDURE — 99214 OFFICE O/P EST MOD 30 MIN: CPT | Performed by: PHYSICIAN ASSISTANT

## 2019-08-16 RX ORDER — ALPRAZOLAM 0.5 MG/1
0.5 TABLET ORAL DAILY PRN
Qty: 30 TABLET | Refills: 2 | Status: SHIPPED | OUTPATIENT
Start: 2019-08-16 | End: 2019-09-15

## 2019-08-16 RX ORDER — BUSPIRONE HYDROCHLORIDE 5 MG/1
TABLET ORAL
Qty: 1 TABLET | Refills: 0 | Status: SHIPPED | OUTPATIENT
Start: 2019-08-16

## 2019-09-24 ENCOUNTER — DOCUMENTATION (OUTPATIENT)
Dept: PSYCHIATRY | Facility: CLINIC | Age: 59
End: 2019-09-24

## 2019-09-24 NOTE — PROGRESS NOTES
Treatment Plan not completed within required time limits due to: Leigh Samina  cancelled appointment  on 11/8/2019 ( wife call to cancel appt for 11/8 @ 10:30am  He is in hospice care )